# Patient Record
Sex: MALE | Race: AMERICAN INDIAN OR ALASKA NATIVE | NOT HISPANIC OR LATINO | Employment: UNEMPLOYED | URBAN - METROPOLITAN AREA
[De-identification: names, ages, dates, MRNs, and addresses within clinical notes are randomized per-mention and may not be internally consistent; named-entity substitution may affect disease eponyms.]

---

## 2022-07-03 ENCOUNTER — HOSPITAL ENCOUNTER (EMERGENCY)
Facility: CLINIC | Age: 52
Discharge: HOME | End: 2022-07-03
Payer: MEDICAID

## 2022-07-03 VITALS
TEMPERATURE: 97.4 F | WEIGHT: 190 LBS | HEART RATE: 80 BPM | DIASTOLIC BLOOD PRESSURE: 89 MMHG | BODY MASS INDEX: 26.6 KG/M2 | SYSTOLIC BLOOD PRESSURE: 116 MMHG | RESPIRATION RATE: 16 BRPM | OXYGEN SATURATION: 100 % | HEIGHT: 71 IN

## 2022-07-03 NOTE — ED TRIAGE NOTES
Triage Assessment & Note:    /89   Pulse 80   Temp 97.4  F (36.3  C) (Oral)   Resp 16   SpO2 100%       Patient presents with: Pt comes ambulatory to triage with reports of social work and medication refill. No reports of fever, cough, SOB, CP, or travel.     Home Treatments/Remedies: n/a    Febrile / Afebrile: afebrile    Duration of C/o: ongoing issues    Catherine An RN  July 3, 2022

## 2022-07-11 ENCOUNTER — TELEPHONE (OUTPATIENT)
Dept: BEHAVIORAL HEALTH | Facility: CLINIC | Age: 52
End: 2022-07-11

## 2022-07-11 ENCOUNTER — HOSPITAL ENCOUNTER (EMERGENCY)
Facility: CLINIC | Age: 52
Discharge: PSYCHIATRIC OR SUBSTANCE USE FACILITY | End: 2022-07-12
Attending: EMERGENCY MEDICINE | Admitting: EMERGENCY MEDICINE
Payer: MEDICAID

## 2022-07-11 DIAGNOSIS — F22 PARANOIA (H): ICD-10-CM

## 2022-07-11 DIAGNOSIS — F32.A DEPRESSION, UNSPECIFIED DEPRESSION TYPE: ICD-10-CM

## 2022-07-11 LAB
ALBUMIN SERPL BCG-MCNC: 3.7 G/DL (ref 3.5–5.2)
ALP SERPL-CCNC: 66 U/L (ref 40–129)
ALT SERPL W P-5'-P-CCNC: 47 U/L (ref 10–50)
AMPHETAMINES UR QL SCN: ABNORMAL
ANION GAP SERPL CALCULATED.3IONS-SCNC: 8 MMOL/L (ref 7–15)
AST SERPL W P-5'-P-CCNC: 32 U/L (ref 10–50)
BARBITURATES UR QL SCN: ABNORMAL
BASOPHILS # BLD AUTO: 0.1 10E3/UL (ref 0–0.2)
BASOPHILS NFR BLD AUTO: 1 %
BENZODIAZ UR QL SCN: ABNORMAL
BILIRUB SERPL-MCNC: 0.6 MG/DL
BUN SERPL-MCNC: 17.9 MG/DL (ref 6–20)
BZE UR QL SCN: ABNORMAL
CALCIUM SERPL-MCNC: 9.1 MG/DL (ref 8.6–10)
CANNABINOIDS UR QL SCN: ABNORMAL
CHLORIDE SERPL-SCNC: 104 MMOL/L (ref 98–107)
CREAT SERPL-MCNC: 1.01 MG/DL (ref 0.67–1.17)
DEPRECATED HCO3 PLAS-SCNC: 26 MMOL/L (ref 22–29)
EOSINOPHIL # BLD AUTO: 0.2 10E3/UL (ref 0–0.7)
EOSINOPHIL NFR BLD AUTO: 4 %
ERYTHROCYTE [DISTWIDTH] IN BLOOD BY AUTOMATED COUNT: 13.2 % (ref 10–15)
GFR SERPL CREATININE-BSD FRML MDRD: >90 ML/MIN/1.73M2
GLUCOSE SERPL-MCNC: 148 MG/DL (ref 70–99)
HCT VFR BLD AUTO: 40.3 % (ref 40–53)
HGB BLD-MCNC: 13 G/DL (ref 13.3–17.7)
IMM GRANULOCYTES # BLD: 0 10E3/UL
IMM GRANULOCYTES NFR BLD: 0 %
LYMPHOCYTES # BLD AUTO: 2.8 10E3/UL (ref 0.8–5.3)
LYMPHOCYTES NFR BLD AUTO: 42 %
MCH RBC QN AUTO: 30.7 PG (ref 26.5–33)
MCHC RBC AUTO-ENTMCNC: 32.3 G/DL (ref 31.5–36.5)
MCV RBC AUTO: 95 FL (ref 78–100)
MONOCYTES # BLD AUTO: 0.6 10E3/UL (ref 0–1.3)
MONOCYTES NFR BLD AUTO: 10 %
NEUTROPHILS # BLD AUTO: 2.9 10E3/UL (ref 1.6–8.3)
NEUTROPHILS NFR BLD AUTO: 43 %
NRBC # BLD AUTO: 0 10E3/UL
NRBC BLD AUTO-RTO: 0 /100
OPIATES UR QL SCN: ABNORMAL
PLAT MORPH BLD: NORMAL
PLATELET # BLD AUTO: 248 10E3/UL (ref 150–450)
POTASSIUM SERPL-SCNC: 3.9 MMOL/L (ref 3.4–5.3)
PROT SERPL-MCNC: 6.1 G/DL (ref 6.4–8.3)
RBC # BLD AUTO: 4.23 10E6/UL (ref 4.4–5.9)
RBC MORPH BLD: NORMAL
SARS-COV-2 RNA RESP QL NAA+PROBE: NEGATIVE
SODIUM SERPL-SCNC: 138 MMOL/L (ref 136–145)
WBC # BLD AUTO: 6.7 10E3/UL (ref 4–11)

## 2022-07-11 PROCEDURE — 99285 EMERGENCY DEPT VISIT HI MDM: CPT | Performed by: EMERGENCY MEDICINE

## 2022-07-11 PROCEDURE — 80307 DRUG TEST PRSMV CHEM ANLYZR: CPT | Performed by: EMERGENCY MEDICINE

## 2022-07-11 PROCEDURE — 250N000013 HC RX MED GY IP 250 OP 250 PS 637: Performed by: EMERGENCY MEDICINE

## 2022-07-11 PROCEDURE — 90791 PSYCH DIAGNOSTIC EVALUATION: CPT

## 2022-07-11 PROCEDURE — 82040 ASSAY OF SERUM ALBUMIN: CPT | Performed by: EMERGENCY MEDICINE

## 2022-07-11 PROCEDURE — C9803 HOPD COVID-19 SPEC COLLECT: HCPCS | Performed by: EMERGENCY MEDICINE

## 2022-07-11 PROCEDURE — 85025 COMPLETE CBC W/AUTO DIFF WBC: CPT | Performed by: EMERGENCY MEDICINE

## 2022-07-11 PROCEDURE — U0003 INFECTIOUS AGENT DETECTION BY NUCLEIC ACID (DNA OR RNA); SEVERE ACUTE RESPIRATORY SYNDROME CORONAVIRUS 2 (SARS-COV-2) (CORONAVIRUS DISEASE [COVID-19]), AMPLIFIED PROBE TECHNIQUE, MAKING USE OF HIGH THROUGHPUT TECHNOLOGIES AS DESCRIBED BY CMS-2020-01-R: HCPCS | Performed by: EMERGENCY MEDICINE

## 2022-07-11 PROCEDURE — 80053 COMPREHEN METABOLIC PANEL: CPT | Performed by: EMERGENCY MEDICINE

## 2022-07-11 PROCEDURE — 99285 EMERGENCY DEPT VISIT HI MDM: CPT | Mod: 25 | Performed by: EMERGENCY MEDICINE

## 2022-07-11 PROCEDURE — 36415 COLL VENOUS BLD VENIPUNCTURE: CPT | Performed by: EMERGENCY MEDICINE

## 2022-07-11 RX ORDER — ATENOLOL 50 MG/1
50 TABLET ORAL ONCE
Status: COMPLETED | OUTPATIENT
Start: 2022-07-11 | End: 2022-07-11

## 2022-07-11 RX ORDER — ATENOLOL 50 MG/1
50 TABLET ORAL 2 TIMES DAILY
COMMUNITY

## 2022-07-11 RX ORDER — PREGABALIN 100 MG/1
100 CAPSULE ORAL 3 TIMES DAILY
Status: COMPLETED | OUTPATIENT
Start: 2022-07-11 | End: 2022-07-11

## 2022-07-11 RX ORDER — ACETAMINOPHEN 325 MG/1
975 TABLET ORAL ONCE
Status: COMPLETED | OUTPATIENT
Start: 2022-07-11 | End: 2022-07-11

## 2022-07-11 RX ORDER — CLONAZEPAM 1 MG/1
1 TABLET ORAL 2 TIMES DAILY PRN
Status: ON HOLD | COMMUNITY
End: 2022-07-12

## 2022-07-11 RX ORDER — RISPERIDONE 2 MG/1
4 TABLET, ORALLY DISINTEGRATING ORAL ONCE
Status: COMPLETED | OUTPATIENT
Start: 2022-07-11 | End: 2022-07-11

## 2022-07-11 RX ORDER — CLONAZEPAM 0.5 MG/1
1 TABLET ORAL ONCE
Status: COMPLETED | OUTPATIENT
Start: 2022-07-11 | End: 2022-07-11

## 2022-07-11 RX ORDER — PREGABALIN 100 MG/1
100 CAPSULE ORAL 3 TIMES DAILY
Status: ON HOLD | COMMUNITY
End: 2022-07-12

## 2022-07-11 RX ORDER — RISPERIDONE 4 MG/1
4 TABLET ORAL DAILY
Status: ON HOLD | COMMUNITY
End: 2022-07-12

## 2022-07-11 RX ORDER — ATENOLOL 50 MG/1
50 TABLET ORAL ONCE
Status: DISCONTINUED | OUTPATIENT
Start: 2022-07-11 | End: 2022-07-12 | Stop reason: HOSPADM

## 2022-07-11 RX ORDER — OXYCODONE HYDROCHLORIDE 10 MG/1
10 TABLET ORAL ONCE
Status: COMPLETED | OUTPATIENT
Start: 2022-07-11 | End: 2022-07-11

## 2022-07-11 RX ORDER — CLONAZEPAM 0.5 MG/1
1 TABLET ORAL 2 TIMES DAILY
Status: COMPLETED | OUTPATIENT
Start: 2022-07-11 | End: 2022-07-11

## 2022-07-11 RX ORDER — PREGABALIN 100 MG/1
100 CAPSULE ORAL ONCE
Status: COMPLETED | OUTPATIENT
Start: 2022-07-11 | End: 2022-07-11

## 2022-07-11 RX ORDER — IBUPROFEN 800 MG/1
800 TABLET, FILM COATED ORAL EVERY 8 HOURS PRN
Status: ON HOLD | COMMUNITY
End: 2022-07-12

## 2022-07-11 RX ORDER — IBUPROFEN 600 MG/1
600 TABLET, FILM COATED ORAL ONCE
Status: COMPLETED | OUTPATIENT
Start: 2022-07-11 | End: 2022-07-11

## 2022-07-11 RX ADMIN — RISPERIDONE 4 MG: 2 TABLET, ORALLY DISINTEGRATING ORAL at 10:04

## 2022-07-11 RX ADMIN — CLONAZEPAM 1 MG: 0.5 TABLET ORAL at 09:36

## 2022-07-11 RX ADMIN — ATENOLOL 50 MG: 50 TABLET ORAL at 09:37

## 2022-07-11 RX ADMIN — IBUPROFEN 600 MG: 600 TABLET, FILM COATED ORAL at 10:03

## 2022-07-11 RX ADMIN — CLONAZEPAM 1 MG: 0.5 TABLET ORAL at 21:05

## 2022-07-11 RX ADMIN — PREGABALIN 100 MG: 100 CAPSULE ORAL at 09:37

## 2022-07-11 RX ADMIN — OXYCODONE HYDROCHLORIDE 10 MG: 10 TABLET ORAL at 21:05

## 2022-07-11 RX ADMIN — ACETAMINOPHEN 975 MG: 325 TABLET, FILM COATED ORAL at 10:04

## 2022-07-11 RX ADMIN — PREGABALIN 100 MG: 100 CAPSULE ORAL at 21:05
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
## 2022-07-11 NOTE — ED TRIAGE NOTES
Pt arrives to ED with c/o mental health issue and is wanting to get helped but has been denied. Pt states that is having trouble coping with all things that have happened to him.

## 2022-07-11 NOTE — ED PROVIDER NOTES
"ED Provider Note  [facility]      History     Chief Complaint   Patient presents with     Psychiatric Evaluation     HPI  Heraclio Johns is a 52 year old male who presents for mental health evaluation. He states that he's homeless, and has been feeling suicidal, no longer wants to live.  He states that a few days ago he was planning to jump off a bridge.  He states that he feels he is being continually persecuted by, \"hate groups.\"  He then goes on to talk about Satan worshipers and how they groom him.  He feels that people are targeting him and that he is unsafe.  He does admit to meth use 2 days ago, states he has not been sleeping.  He states that some people think he is crazy, but given that other people around him are hearing the same things he is hearing, he does not think so.  Denies any acute physical complaints or injury at this time.    Past Medical History  Past Medical History:   Diagnosis Date     Hepatitis C      History reviewed. No pertinent surgical history.  clonazePAM (KLONOPIN) 1 MG tablet  ibuprofen (ADVIL/MOTRIN) 800 MG tablet  pregabalin (LYRICA) 100 MG capsule  risperiDONE (RISPERDAL) 4 MG tablet      Allergies   Allergen Reactions     Codeine Itching     Depakote [Valproic Acid] Other (See Comments)     pancreatitis     Family History  History reviewed. No pertinent family history.  Social History   Social History     Tobacco Use     Smoking status: Current Every Day Smoker     Types: Cigarettes     Smokeless tobacco: Never Used   Substance Use Topics     Alcohol use: Yes     Drug use: Yes     Types: Methamphetamines      Past medical history, past surgical history, medications, allergies, family history, and social history were reviewed with the patient. No additional pertinent items.       Review of Systems  A complete review of systems was performed with pertinent positives and negatives noted in the HPI, and all other systems negative.    Physical Exam   BP: " (!) 147/106  Pulse: 85  Resp: 16  Weight: 86.2 kg (190 lb)  SpO2: 100 %  Physical Exam  Constitutional:       General: He is not in acute distress.     Appearance: He is not diaphoretic.   HENT:      Head: Atraumatic.   Eyes:      General: No scleral icterus.     Pupils: Pupils are equal, round, and reactive to light.   Cardiovascular:      Heart sounds: Normal heart sounds.   Pulmonary:      Effort: No respiratory distress.      Breath sounds: Normal breath sounds.   Abdominal:      Palpations: Abdomen is soft.      Tenderness: There is no abdominal tenderness.   Musculoskeletal:         General: No tenderness.   Skin:     General: Skin is warm.         ED Course      Procedures         Mental Health Risk Assessment      PSS-3    Date and Time Over the past 2 weeks have you felt down, depressed, or hopeless? Over the past 2 weeks have you had thoughts of killing yourself? Have you ever attempted to kill yourself? When did this last happen? User   07/11/22 0450 yes yes yes more than 6 months ago MP      C-SSRS ([place])    Date and Time Q1 Wished to be Dead (Past Month) Q2 Suicidal Thoughts (Past Month) Q3 Suicidal Thought Method Q4 Suicidal Intent without Specific Plan Q5 Suicide Intent with Specific Plan Q6 Suicide Behavior (Lifetime) Within the Past 3 Months? RETIRED: Level of Risk per Screen Screening Not Complete User   07/11/22 0534 yes yes yes no yes yes -- -- -- HK   07/11/22 0450 yes yes yes no yes yes -- -- -- MP                     No results found for any visits on 07/11/22.  Medications - No data to display     Assessments & Plan (with Medical Decision Making)   The patient does seem paranoid.  He is suicidal.  He denies any acute physical complaints or injuries.  We will have DEC see him.  He will be signed out at shift change to the oncoming provider pending assessment.    Dictation Disclaimer: Some of this Note has been completed with voice-recognition dictation software. Although errors are  generally corrected real-time, there is the potential for a rare error to be present in the completed chart.      I have reviewed the nursing notes. I have reviewed the findings, diagnosis, plan and need for follow up with the patient.    New Prescriptions    No medications on file       Final diagnoses:   Paranoia (H)   Depression, unspecified depression type       --  Tracy Forrest  [facility] EMERGENCY DEPARTMENT  7/11/2022     Tracy Forrest MD  07/11/22 0709
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
## 2022-07-11 NOTE — TELEPHONE ENCOUNTER
Pt brought self to [facility], SI.  B: Pt from [place] but left due to being estranged from family, Children dont want anything to do with him.  Pt [place].  States yesterday he was jumping in front of cars but they would swerve away.  Hx recent meth use, states use is on/off.  Pt states some paranoia of people out to kill him.   A: depressed and paranoid.  No aggression, cooperative, vol  R:  Patient cleared and ready for behavioral bed placement: Yes

## 2022-07-12 ENCOUNTER — TELEPHONE (OUTPATIENT)
Dept: BEHAVIORAL HEALTH | Facility: CLINIC | Age: 52
End: 2022-07-12

## 2022-07-12 ENCOUNTER — HOSPITAL ENCOUNTER (INPATIENT)
Facility: HOSPITAL | Age: 52
LOS: 6 days | Discharge: HOME | End: 2022-07-18
Attending: STUDENT IN AN ORGANIZED HEALTH CARE EDUCATION/TRAINING PROGRAM | Admitting: STUDENT IN AN ORGANIZED HEALTH CARE EDUCATION/TRAINING PROGRAM
Payer: MEDICAID

## 2022-07-12 VITALS
RESPIRATION RATE: 18 BRPM | WEIGHT: 190 LBS | TEMPERATURE: 98.1 F | BODY MASS INDEX: 26.5 KG/M2 | OXYGEN SATURATION: 100 % | HEART RATE: 64 BPM | SYSTOLIC BLOOD PRESSURE: 96 MMHG | DIASTOLIC BLOOD PRESSURE: 66 MMHG

## 2022-07-12 DIAGNOSIS — M54.50 CHRONIC MIDLINE LOW BACK PAIN WITHOUT SCIATICA: ICD-10-CM

## 2022-07-12 DIAGNOSIS — G89.29 CHRONIC MIDLINE LOW BACK PAIN WITHOUT SCIATICA: ICD-10-CM

## 2022-07-12 DIAGNOSIS — F41.9 ANXIETY DISORDER, UNSPECIFIED TYPE: Primary | ICD-10-CM

## 2022-07-12 DIAGNOSIS — F31.9 BIPOLAR AFFECTIVE DISORDER, REMISSION STATUS UNSPECIFIED (H): ICD-10-CM

## 2022-07-12 PROBLEM — Z59.00 HOMELESS: Status: ACTIVE | Noted: 2022-06-10

## 2022-07-12 PROBLEM — F90.2 ADHD (ATTENTION DEFICIT HYPERACTIVITY DISORDER), COMBINED TYPE: Status: ACTIVE | Noted: 2022-07-11

## 2022-07-12 PROBLEM — F15.20 AMPHETAMINE USE DISORDER, MODERATE (H): Status: ACTIVE | Noted: 2022-06-10

## 2022-07-12 PROBLEM — F15.20 AMPHETAMINE DEPENDENCE (H): Status: ACTIVE | Noted: 2022-07-11

## 2022-07-12 PROBLEM — K21.9 GERD (GASTROESOPHAGEAL REFLUX DISEASE): Status: ACTIVE | Noted: 2022-05-04

## 2022-07-12 PROBLEM — F90.9 ADHD: Status: ACTIVE | Noted: 2021-06-06

## 2022-07-12 PROBLEM — F17.200 TOBACCO DEPENDENCE: Status: ACTIVE | Noted: 2022-07-11

## 2022-07-12 PROBLEM — R45.89 SUICIDAL BEHAVIOR: Status: ACTIVE | Noted: 2022-07-12

## 2022-07-12 PROBLEM — R23.8 BLISTERS OF MULTIPLE SITES: Status: ACTIVE | Noted: 2022-06-10

## 2022-07-12 PROBLEM — G90.529 REFLEX SYMPATHETIC DYSTROPHY OF LOWER EXTREMITY: Status: ACTIVE | Noted: 2019-06-27

## 2022-07-12 PROBLEM — I10 HYPERTENSION: Status: ACTIVE | Noted: 2022-06-16

## 2022-07-12 PROCEDURE — 99223 1ST HOSP IP/OBS HIGH 75: CPT | Mod: AI | Performed by: NURSE PRACTITIONER

## 2022-07-12 PROCEDURE — 124N000001 HC R&B MH

## 2022-07-12 PROCEDURE — 99222 1ST HOSP IP/OBS MODERATE 55: CPT | Performed by: NURSE PRACTITIONER

## 2022-07-12 PROCEDURE — 250N000013 HC RX MED GY IP 250 OP 250 PS 637: Performed by: NURSE PRACTITIONER

## 2022-07-12 PROCEDURE — 250N000013 HC RX MED GY IP 250 OP 250 PS 637: Performed by: EMERGENCY MEDICINE

## 2022-07-12 RX ORDER — ATENOLOL 50 MG/1
50 TABLET ORAL ONCE
Status: COMPLETED | OUTPATIENT
Start: 2022-07-12 | End: 2022-07-12

## 2022-07-12 RX ORDER — POLYETHYLENE GLYCOL 3350 17 G/17G
17 POWDER, FOR SOLUTION ORAL DAILY PRN
Status: DISCONTINUED | OUTPATIENT
Start: 2022-07-12 | End: 2022-07-18 | Stop reason: HOSPADM

## 2022-07-12 RX ORDER — CLONAZEPAM 1 MG/1
1 TABLET ORAL 2 TIMES DAILY
COMMUNITY
Start: 2022-07-05 | End: 2022-07-12

## 2022-07-12 RX ORDER — OLANZAPINE 10 MG/1
10 TABLET ORAL 3 TIMES DAILY PRN
Status: DISCONTINUED | OUTPATIENT
Start: 2022-07-12 | End: 2022-07-18 | Stop reason: HOSPADM

## 2022-07-12 RX ORDER — RISPERIDONE 2 MG/1
4 TABLET ORAL AT BEDTIME
Status: DISCONTINUED | OUTPATIENT
Start: 2022-07-12 | End: 2022-07-14

## 2022-07-12 RX ORDER — CLONAZEPAM 0.5 MG/1
1 TABLET ORAL 2 TIMES DAILY
Status: COMPLETED | OUTPATIENT
Start: 2022-07-12 | End: 2022-07-12

## 2022-07-12 RX ORDER — MAGNESIUM HYDROXIDE/ALUMINUM HYDROXICE/SIMETHICONE 120; 1200; 1200 MG/30ML; MG/30ML; MG/30ML
30 SUSPENSION ORAL EVERY 4 HOURS PRN
Status: DISCONTINUED | OUTPATIENT
Start: 2022-07-12 | End: 2022-07-18 | Stop reason: HOSPADM

## 2022-07-12 RX ORDER — PREGABALIN 100 MG/1
100 CAPSULE ORAL 3 TIMES DAILY
Status: ON HOLD | COMMUNITY
Start: 2022-07-05 | End: 2022-07-17

## 2022-07-12 RX ORDER — LANOLIN ALCOHOL/MO/W.PET/CERES
3 CREAM (GRAM) TOPICAL
Status: DISCONTINUED | OUTPATIENT
Start: 2022-07-12 | End: 2022-07-18 | Stop reason: HOSPADM

## 2022-07-12 RX ORDER — IBUPROFEN 600 MG/1
600 TABLET, FILM COATED ORAL ONCE
Status: DISCONTINUED | OUTPATIENT
Start: 2022-07-12 | End: 2022-07-12 | Stop reason: HOSPADM

## 2022-07-12 RX ORDER — OXYCODONE HYDROCHLORIDE 5 MG/1
5 TABLET ORAL ONCE
Status: COMPLETED | OUTPATIENT
Start: 2022-07-12 | End: 2022-07-12

## 2022-07-12 RX ORDER — PREGABALIN 100 MG/1
100 CAPSULE ORAL 3 TIMES DAILY
Status: COMPLETED | OUTPATIENT
Start: 2022-07-12 | End: 2022-07-12

## 2022-07-12 RX ORDER — HYDROXYZINE HYDROCHLORIDE 25 MG/1
50 TABLET, FILM COATED ORAL EVERY 4 HOURS PRN
Status: DISCONTINUED | OUTPATIENT
Start: 2022-07-12 | End: 2022-07-18 | Stop reason: HOSPADM

## 2022-07-12 RX ORDER — ACETAMINOPHEN 325 MG/1
650 TABLET ORAL EVERY 4 HOURS PRN
Status: DISCONTINUED | OUTPATIENT
Start: 2022-07-12 | End: 2022-07-18 | Stop reason: HOSPADM

## 2022-07-12 RX ORDER — IBUPROFEN 800 MG/1
800 TABLET, FILM COATED ORAL EVERY 8 HOURS PRN
Status: DISCONTINUED | OUTPATIENT
Start: 2022-07-12 | End: 2022-07-18 | Stop reason: HOSPADM

## 2022-07-12 RX ORDER — RISPERIDONE 4 MG/1
4 TABLET ORAL AT BEDTIME
Status: ON HOLD | COMMUNITY
Start: 2022-07-05 | End: 2022-07-17

## 2022-07-12 RX ORDER — OLANZAPINE 10 MG/2ML
10 INJECTION, POWDER, FOR SOLUTION INTRAMUSCULAR 3 TIMES DAILY PRN
Status: DISCONTINUED | OUTPATIENT
Start: 2022-07-12 | End: 2022-07-18 | Stop reason: HOSPADM

## 2022-07-12 RX ORDER — IBUPROFEN 200 MG
600 TABLET ORAL
Status: ON HOLD | COMMUNITY
Start: 2022-06-23 | End: 2022-07-17

## 2022-07-12 RX ORDER — CLONAZEPAM 0.5 MG/1
0.5 TABLET ORAL 2 TIMES DAILY
Status: DISCONTINUED | OUTPATIENT
Start: 2022-07-12 | End: 2022-07-14

## 2022-07-12 RX ADMIN — CLONAZEPAM 0.5 MG: 0.5 TABLET ORAL at 20:34

## 2022-07-12 RX ADMIN — IBUPROFEN 800 MG: 800 TABLET, FILM COATED ORAL at 17:46

## 2022-07-12 RX ADMIN — ATENOLOL 50 MG: 50 TABLET ORAL at 08:21

## 2022-07-12 RX ADMIN — PREGABALIN 100 MG: 75 CAPSULE ORAL at 20:34

## 2022-07-12 RX ADMIN — OXYCODONE HYDROCHLORIDE 5 MG: 5 TABLET ORAL at 08:28

## 2022-07-12 RX ADMIN — PREGABALIN 100 MG: 100 CAPSULE ORAL at 08:21

## 2022-07-12 RX ADMIN — CLONAZEPAM 1 MG: 0.5 TABLET ORAL at 08:21

## 2022-07-12 RX ADMIN — RISPERIDONE 4 MG: 2 TABLET ORAL at 20:34

## 2022-07-12 ASSESSMENT — ACTIVITIES OF DAILY LIVING (ADL)
CONCENTRATING,_REMEMBERING_OR_MAKING_DECISIONS_DIFFICULTY: NO
ADLS_ACUITY_SCORE: 30
ADLS_ACUITY_SCORE: 30
TOILETING_ISSUES: NO
ADLS_ACUITY_SCORE: 43
FALL_HISTORY_WITHIN_LAST_SIX_MONTHS: YES
DRESSING/BATHING_DIFFICULTY: NO
VISION_MANAGEMENT: GLASSES
ADLS_ACUITY_SCORE: 45
DIFFICULTY_EATING/SWALLOWING: NO
EQUIPMENT_CURRENTLY_USED_AT_HOME: CANE, STRAIGHT
NUMBER_OF_TIMES_PATIENT_HAS_FALLEN_WITHIN_LAST_SIX_MONTHS: 180
WEAR_GLASSES_OR_BLIND: YES
ADLS_ACUITY_SCORE: 30
ADLS_ACUITY_SCORE: 30
WALKING_OR_CLIMBING_STAIRS_DIFFICULTY: NO
DOING_ERRANDS_INDEPENDENTLY_DIFFICULTY: NO

## 2022-07-12 NOTE — TELEPHONE ENCOUNTER
R: 5Sbasia / Lj / Meron  00:36 - On call accepts. Placed pt in queue. 00:40 - Notified unit. 00:42 - Notified ED    [facility] ED: [phone]

## 2022-07-12 NOTE — PROGRESS NOTES
07/12/22 1318   Patient Belongings   Did you bring any home meds/supplements to the hospital?  No   Patient Belongings remains with patient;locker;sent to security per site process   Patient Belongings Remaining with Patient glasses;body jewelry   Patient Belongings Put in Hospital Secure Location (Security or Locker, etc.) clothing;wallet;other (see comments);shoes   Belongings Search Yes   Clothing Search Yes   Second Staff Terence (RN)   Comment blue flashlight, pen, pnecil, lighter, pocket notebook, misc. papers, rolling papers, black baseball cap, black shoes, 3 pairs socks, belt, mask x2, cargo shorts, black underwear, green t shirt   List items sent to safe: brown wallet, [place] ID, community card, business card, library card, misc. Papers          All other belongings put in assigned cubby in belongings room: see comment       I have reviewed my belongings list on admission and verify that it is correct.     Patient signature_______________________________    Second staff witness (if patient unable to sign) ______________________________       I have received all my belongings at discharge.    Patient signature________________________________    Kita   7/12/2022  1:22 PM

## 2022-07-12 NOTE — H&P
[facility]    History and Physical  Medical Services       Date of Admission:  7/12/2022  Date of Service (when I saw the patient): 07/12/22    Assessment & Plan     Principal Problem:    Suicidal behavior    Active Medical Problems:    Hypertension- vitals stable, denies chest pain, sob. He reports he takes atenolol. His bp is on the low side at 94/61 and pulse 53. Will hold the atenolol at this time.       GERD (gastroesophageal reflux disease)- denies GERD symptoms. Pt states he stopped his GERD medication and has not needed them since his cholecystectomy.       Chronic viral hepatitis C (H)- AST, ALT wnl. Denies abdominal pain. Pt denies he has not been treated.     Left leg pain- pt report in 2018 he had a laparoscopic knee surgery for a meniscus repair which led to compartmental syndrome. He reports chronic pain and reports he uses a cane at home. Explained that canes can not be used on the inpatient unit  And he would like a walker. Pt is requesting oxycodone, OxyContin, ketamine to name a few of the medication requests he had. Explained I would reviewed prescribe home medications and would order what was appropriate. Pt became pressured and continued to ask for different narcotics. He declined Lidoderm patches. Tylenol, ibuprofen prn. Liver function is stable. Alternate tylenol with ibuprofen. Walker ordered, OT consult.     Pt medically stable, no acute medical concerns. Chronic medical problems stable. Will sign off. Please consult for any new medical issues or concerns.       Code Status: Full Code    Margo Walters CNP    Primary Care Physician   Physician No Ref-Primary    Chief Complaint   Psych evaluation     History is obtained from the patient and medical chart     History of Present Illness   (per ED) Heraclio Johns is a 52 year old male who presents for mental health evaluation. He states that he's homeless, and has been feeling suicidal, no longer wants to live.  He states that a few  "days ago he was planning to jump off a bridge.  He states that he feels he is being continually persecuted by, \"hate groups.\"  He then goes on to talk about Satan worshipers and how they groom him.  He feels that people are targeting him and that he is unsafe.  He does admit to meth use 2 days ago, states he has not been sleeping.  He states that some people think he is crazy, but given that other people around him are hearing the same things he is hearing, he does not think so.  Denies any acute physical complaints or injury at this time.    Past Medical History    I have reviewed this patient's medical history and updated it with pertinent information if needed.   Past Medical History:   Diagnosis Date     Hepatitis C        Past Surgical History   I have reviewed this patient's surgical history and updated it with pertinent information if needed.  No past surgical history on file.    Prior to Admission Medications   Prior to Admission Medications   Prescriptions Last Dose Informant Patient Reported? Taking?   atenolol (TENORMIN) 50 MG tablet  Self Yes No   Sig: Take 50 mg by mouth 2 times daily   clonazePAM (KLONOPIN) 1 MG tablet  Self Yes No   Sig: Take 1 mg by mouth 2 times daily as needed for anxiety   ibuprofen (ADVIL/MOTRIN) 800 MG tablet  Self Yes No   Sig: Take 800 mg by mouth every 8 hours as needed for moderate pain   pregabalin (LYRICA) 100 MG capsule  Self Yes No   Sig: Take 100 mg by mouth 3 times daily   risperiDONE (RISPERDAL) 4 MG tablet  Self Yes No   Sig: Take 4 mg by mouth daily      Facility-Administered Medications: None     Allergies   Allergies   Allergen Reactions     Codeine Itching     Depakote [Valproic Acid] Other (See Comments)     pancreatitis       Social History   I have reviewed this patient's social history and updated it with pertinent information if needed. Heraclio Johns  reports that he has been smoking cigarettes. He has never used smokeless tobacco. He reports current " alcohol use. He reports current drug use. Drug: Methamphetamines.    Family History   I have reviewed this patient's family history and updated it with pertinent information if needed.   No family history on file.    Review of Systems   CONSTITUTIONAL:  negative  EYES:  negative  HEENT:  negative  RESPIRATORY:  negative  CARDIOVASCULAR:  negative  GASTROINTESTINAL:  negative  GENITOURINARY:  negative  INTEGUMENT/BREAST:  negative  HEMATOLOGIC/LYMPHATIC:  negative  ALLERGIC/IMMUNOLOGIC:  negative  ENDOCRINE:  negative  MUSCULOSKELETAL:  Negative except chronic pain  NEUROLOGICAL:  negative    Physical Exam   Temp: 97.8  F (36.6  C) Temp src: Temporal BP: 94/61 Pulse: 53   Resp: 16 SpO2: 98 % O2 Device: None (Room air)    Vital Signs with Ranges  Temp:  [97.8  F (36.6  C)-98.1  F (36.7  C)] 97.8  F (36.6  C)  Pulse:  [53-68] 53  Resp:  [16-18] 16  BP: ()/(61-74) 94/61  SpO2:  [98 %-100 %] 98 %  0 lbs 0 oz    Constitutional: awake, alert, cooperative, no apparent distress, and appears stated age, vitals stable  Eyes: Lids and lashes normal, pupils equal, round and reactive to light, extra ocular muscles intact, sclera clear, conjunctiva normal  ENT: Normocephalic, without obvious abnormality, atraumatic, external ears without lesions, oral pharynx with moist mucous membranes, no erythema or exudates  Hematologic / Lymphatic: no cervical lymphadenopathy  Respiratory: No increased work of breathing, good air exchange, clear to auscultation bilaterally, no crackles or wheezing  Cardiovascular: Normal apical impulse, regular rate and rhythm, normal S1 and S2, no S3 or S4, and no murmur noted  GI: normal bowel sounds, soft, non-distended, non-tender, no masses palpated, no hepatosplenomegally  Genitounirinary: deferred  Skin: normal skin color, texture, turgor and no redness, warmth, or swelling  Musculoskeletal: There is no redness, warmth, or swelling of the joints.  Full range of motion noted.    Neurologic: Awake,  alert, oriented to name, place and time.  Neuropsychiatric: General: pressured, at times elevated, and normal eye contact    Data   Data reviewed today:   Recent Labs   Lab 07/11/22  2108   WBC 6.7   HGB 13.0*   MCV 95         POTASSIUM 3.9   CHLORIDE 104   CO2 26   BUN 17.9   CR 1.01   ANIONGAP 8   FRANCESCA 9.1   *   ALBUMIN 3.7   PROTTOTAL 6.1*   BILITOTAL 0.6   ALKPHOS 66   ALT 47   AST 32       No results found for this or any previous visit (from the past 24 hour(s)).
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
## 2022-07-13 PROCEDURE — 250N000013 HC RX MED GY IP 250 OP 250 PS 637: Performed by: NURSE PRACTITIONER

## 2022-07-13 PROCEDURE — 99232 SBSQ HOSP IP/OBS MODERATE 35: CPT | Performed by: NURSE PRACTITIONER

## 2022-07-13 PROCEDURE — 124N000001 HC R&B MH

## 2022-07-13 PROCEDURE — 999N000209 HC STATISTIC OT OUTPT VISIT

## 2022-07-13 RX ADMIN — CLONAZEPAM 0.5 MG: 0.5 TABLET ORAL at 09:22

## 2022-07-13 RX ADMIN — IBUPROFEN 800 MG: 800 TABLET, FILM COATED ORAL at 13:33

## 2022-07-13 RX ADMIN — CLONAZEPAM 0.5 MG: 0.5 TABLET ORAL at 20:58

## 2022-07-13 RX ADMIN — IBUPROFEN 800 MG: 800 TABLET, FILM COATED ORAL at 21:02

## 2022-07-13 RX ADMIN — RISPERIDONE 4 MG: 2 TABLET ORAL at 20:58

## 2022-07-13 RX ADMIN — ACETAMINOPHEN 650 MG: 325 TABLET, FILM COATED ORAL at 21:02

## 2022-07-13 RX ADMIN — PREGABALIN 100 MG: 75 CAPSULE ORAL at 13:33

## 2022-07-13 RX ADMIN — PREGABALIN 100 MG: 75 CAPSULE ORAL at 20:58

## 2022-07-13 RX ADMIN — PREGABALIN 100 MG: 75 CAPSULE ORAL at 09:22

## 2022-07-13 ASSESSMENT — ACTIVITIES OF DAILY LIVING (ADL)
ADLS_ACUITY_SCORE: 30
HYGIENE/GROOMING: INDEPENDENT

## 2022-07-13 NOTE — PROGRESS NOTES
"Pt referred with malnutrition screen score of 5 - weight loss >34#, decreased appetite.     52 yom admitted with suicidal ideation.  Hx noted to include:  drug use, HTN, GERD, viral hepatitis.      Ht-71\", Wt-190# - Per [facility]  IBWR is 155-189#.  BMI is 26.5  No documented weight hx noted.     Regular diet ordered.  Pt has consumed 100% meals since being admitted.      Labs/meds reviewed.     Estimated nutritional needs are 0158-9809 calories (25-30 kcal/kg), 86 gm protein (1.0 gm/kg).      Pt does not meet necessary criteria for malnutrition diagnosis.  Current weight and intake acceptable.  No nutrition interventions indicated at this time.  RD will follow weights/intake.   "
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
## 2022-07-13 NOTE — PROGRESS NOTES
"Attempted to meet with pt x2 today for OT eval.  Pt in bed both times, states that he is very tired and is unable to focus or participate because he is so tired.  Declines getting out of bed to ambulate with walker for assessment, states his leg is \"on fire\" and \"they are not giving me the pain meds I need so I will just be miserable here\".  Will attempt OT eval tomorrow.    "

## 2022-07-13 NOTE — PLAN OF CARE
"Social Service Psychosocial Assessment  Attempted to speak with pt for assessment. x2 pt unwilling to participate due to sleeping. Notes grabbed from ED and nursing.  Presenting Problem: Pt presents for mental health evaluation. He states that he's homeless, and has been feeling suicidal, no longer wants to live. He states that a few days ago he was planning to jump off a bridge.   Marital Status:     Spouse / Children: None   Psychiatric TX HX: This is the pt's first time on our unit. Pt noted one admission during [facility] which resulted in a detox. Pt noted one admission in [place] within the past six months as a previous in-patient mental health admissions.  Suicide Risk Assessment: Pt admitted with SI and plan to jump off a bridge. Pt has hx of SI and other plans of self harm. Unable to assess if pt is suicidal today during assessment.   Access to Lethal Means (explain): Denies  Family Psych HX: Unknown    A & Ox: x4  Medication Adherence: See H&P  Medical Issues: See H&P    Visual -Motor Functioning: Good     Communication Skills /Needs: Good  Ethnicity:   Spirituality/Cheondoism Affiliation: None    Clergy Request: No   History: None reported   Living Situation: Currently homeless and has been staying at the [facility].    ADL s: Independent   Education: Unknown  Financial Situation: Unknown     Occupation: Unemployed   Leisure & Recreation: Unknown    Childhood History: Unknown    Trauma Abuse HX:  Unknown   Relationship / Sexuality: Unknown   Substance Use/ Abuse: Utox positive for Amphetamines and cannabinoids. Pt shared history of Residential treatment for Cocaine and Methamphetamine.   Chemical Dependency Treatment HX: Pt noted \"I was clean from 0864-0166\" noting he has had multiple relapses since that time with Methamphetamine use.   Legal Issues: Pt denied any current legal concerns or pending charges. Pt did share a history of incarceration in group home in 1996 after a sex " offense charge  Significant Life Events: Pt appears to have been seen two times at minimum in the last month through [facility] for on going substance use and housing concerns.   Strengths: Ability to communicate needs, in a safe environment   Challenges /Limitation: Homeless, lack of services    Patient Support Contact (Include name, relationship, number, and summary of conversation): Pt does not have any CORBIN's signed at this time.    Interventions:       Community-Based Programs- Would benefit    Medical/Dental Care- No PCP listed     CD Evaluation/Rule 25/Aftercare- Would benefit    Medication Management- Would benefit    Individual Therapy- Would benefit    Housing/Placement- Homeless    Insurance Coverage- MEDICAID    Suicide Risk Assessment- Pt admitted with SI and plan to jump off a bridge. Pt has hx of SI and other plans of self harm. Unable to assess if pt is suicidal today during assessment.     High Risk Safety Plan- Talk to supports; Call crisis lines; Go to local ER if feeling suicidal.  REEMA RIOJAS  7/13/2022  8:46 AM
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
## 2022-07-13 NOTE — PLAN OF CARE
"  Problem: Behavioral Health Plan of Care  Goal: Patient-Specific Goal (Individualization)  Description: Patient will eat >50% of meals.   Patient will attend >50% of groups.     Outcome: Ongoing, Not Progressing  Note: 15:40: Received end of shift report from Mariama BEACH. Pt sitting out in dayroom upon arrival, mood is calm, blunted affect.     21:05: Pt isolates self in room majority of shift, compliant with HS medication administration. PRN ibuprofen et PRN acetaminophen given for \"6' /10 neck/back pain. States little relief with scheduled lyrica, oxycodone works the best, \"They won't give me my usual medications.\" \"Ritalin helps.\"     Face to face end of shift report to be communicated to on-coming [facility] staff.     Tania Farrar RN  7/13/2022  9:31 PM        Problem: Thought Process Alteration  Goal: Optimal Thought Clarity  Description: Patient will have a decrease in paranoia by discharge.  Patient will be able to participate in nursing assessment.   Patient will sleep 6-8 hours per night.   Outcome: Ongoing, Progressing     Problem: Suicide Risk  Goal: Absence of Self-Harm  Outcome: Ongoing, Progressing          "

## 2022-07-13 NOTE — DISCHARGE INSTRUCTIONS
Behavioral Discharge Planning and Instructions    Summary:     Main Diagnosis:     Health Care Follow-up:     NUMBERS TO CALL IN CRISIS    National Suicide Prevention Lifeline ([facility])  1-519.736.4454    Crisis Text Line ([place])  Text  HOME  to 669961    Mental Health Crisis Line ([place], [place])  423.408.9683    [facility] Mobile Crisis ([place], [place])   583-634-6977      If you are feeling very unsafe, call 911 or bring yourself to the Emergency Room        Counseling in [place]:  Sergio Campos           [phone]  [facility]    Tamiko Marsh, [facility]      [phone]  [facility]     Nissa Washington, [facility] (young kids)    [phone]   Venecia Crespo, [facility] (older kids & adults)  [phone]   Oumar Chester, [facility]      [phone]  [facility]       [phone]   Ross Love MS,    Nicole Barajas MA, [facility]   Wojciech Ríos MS psychotherapist   Linette Trujillo MS, [facility]   Isaura Perdomo, [facility], counselor   Catherine Love [facility], counselor  [place]        [phone]  Bradly Gallagher, counseling  Dr Deepthi Whiteside, psychiatry  Keisha Motley, counseling  Yohannes Slaughter, counseling  Blair Francois, counseling  Haylie Harris, psychiatry   [facility]       [phone]   Rolanda Sewell MA, LMFT, RPFS   Sandra Parekh MSW, [facility]          [phone]  [facility]      [phone]   Catherine Cancino MS, [facility]          [phone]        Ann Heart MSW, [facility] , C-MI   Dayana Londono MSW, [facility]                                                    Conner Ruiz [facility]      Rama Najera MS, [facility]                [phone]      Marina MitchellyD, owner      Yumiko WARNER, BRANNON Corrigan PhD   Lizette Ludwig MSW, LICSW [facility]       [phone]   Candis [facility]   Mathew ADLER,  CNP,     Sujey Bansal MSW, LGSW (adolescents)   Jackie Grinnel APRN, CNP (Hib via telehealth; adolescents)   Jenise KUMAR CNP (Hib via telehealth)   Jose Sullivan MA, LPC, BCIA (Hib via telehealth)   Debbie Arriola [facility] MSW, LGSW   Margo Deniserabee LICSW   Ezequiel Haley DNP, APRN, CNP   Tracy Alexander RN, BSN   Erika Tirado RN-BC, BSN (Hib via telehealth)  [facility]         [phone]   Tata Odell, MSN, [facility]           [phone]   Jorge Luke MA, [facility]   Jaqueline Lyles MS RN [facility] NP   Catherine Carpenter, [employer]         [phone]  [facility]       [phone]   [facility]   Jennifer Danielson (to be LPC)   Ga Zavala (to be [facility])      Counseling in [place]:  [facility]       [phone]   mental health & CD counseling  Jose Harrell       [phone]  [facility]       [phone]  [facility]        [phone]   Rolanda Brown  [facility]       [facility]              [phone]   can do weekends and evenings   Genaro Campo, MSW, LICSW, LCSW, CCTP    Carroll Díaz MA, LMFT, [facility]  [facility]       evenings, weekends  [phone]      Counseling in [place]:  [facility]         [phone]   Tata Odell, MSN, [facility]   Grace Beach MA, [facility]  [facility]      [phone]  [facility]       [phone]   Venessa Marsh PMFT  [facility]       Giovanna Nicolas       [phone]  [place] [id number] [facility]     [facility]      [phone]  [facility]     Yohannes Krishnamurthy MS Ed, [facility]    [phone]    (kids) denotes providers who also see kids      Attend all scheduled appointments with your outpatient providers. Call at least 24 hours in advance if you need to reschedule an appointment to ensure continued access to your outpatient  providers.     Major Treatments, Procedures and Findings:  You were provided with: a psychiatric assessment, assessed for medical stability, medication evaluation and/or management, group therapy, family therapy, individual therapy, CD evaluation/assessment, milieu management, and medical interventions    Symptoms to Report: feeling more aggressive, increased confusion, losing more sleep, mood getting worse, or thoughts of suicide    Early warning signs can include: increased depression or anxiety sleep disturbances increased thoughts or behaviors of suicide or self-harm  increased unusual thinking, such as paranoia or hearing voices    Safety and Wellness:  Take all medicines as directed.  Make no changes unless your doctor suggests them.      Follow treatment recommendations.  Refrain from alcohol and non-prescribed drugs.  Ask your support system to help you reduce your access to items that could harm yourself or others. Items could include:  Firearms  Medicines (both prescribed and over-the-counter)  Knives and other sharp objects  Ropes and like materials  Car keys  If there is a concern for safety, call 911. If there is a concern for safety, call 911.    Resources:   Crisis Intervention: 138.587.6447 or 955-363-4697 (TTY: 665.396.3973).  Call anytime for help.  National [place] on Mental Illness (www.[place].jj.org): 125.706.3956 or 689-205-1045.  [place] Association for Children's Mental Health (www.macmh.org): 907.400.2233.  Alcoholics Anonymous (www.alcoholics-anonymous.org): Check your phone book for your local chapter.  Suicide Awareness Voices of Education (SAVE) (www.save.org): 259-667-CLXB (8811)  National Suicide Prevention Line (www.mentalhealthmn.org): 616-533-JRVA (6619)  Mental Health Consumer/Survivor Network of [place] (www.mhcsn.net): 206.726.4246 or 157-751-2535  Mental Health Association of [place] (www.mentalhealth.org): 323.102.2045 or 491-658-5761  Self- Management and Recovery Training., SMART-- Toll  "free: 390.957.3217  www.[employer]  Text 4 Life: txt \"LIFE\" to 01895 for immediate support and crisis intervention  Crisis text line: Text \"[place]\" to 450839. Free, confidential, 24/7.  Crisis Intervention: 227.376.3098 or 358-079-7319. Call anytime for help.     General Medication Instructions:   See your medication sheet(s) for instructions.   Take all medicines as directed.  Make no changes unless your doctor suggests them.   Go to all your doctor visits.  Be sure to have all your required lab tests. This way, your medicines can be refilled on time.  Do not use any drugs not prescribed by your doctor.  Avoid alcohol.    Advance Directives:   Scanned document on file with [facility]? No scanned doc  Is document scanned? Pt states no documents  Honoring Choices Your Rights Handout: Informed and given  Was more information offered? Pt declined    The Treatment team has appreciated the opportunity to work with you. If you have any questions or concerns about your recent admission, you can contact the unit which can receive your call 24 hours a day, 7 days a week. They will be able to get in touch with a Provider if needed. The unit number is [phone] .  "

## 2022-07-13 NOTE — PLAN OF CARE
"  Problem: Behavioral Health Plan of Care  Goal: Patient-Specific Goal (Individualization)  Description: Patient will eat >50% of meals.   Patient will attend >50% of groups.     Outcome: Ongoing, Progressing     Problem: Thought Process Alteration  Goal: Optimal Thought Clarity  Description: Patient will have a decrease in paranoia by discharge.  Patient will be able to participate in nursing assessment.   Patient will sleep 6-8 hours per night.   Outcome: Ongoing, Progressing   Goal Outcome Evaluation:      Pt came out for breakfast but returned to his room to bed. Did wake up and took am medications willingly. Calm and cooperative. Stated he felt better today when he was in the lounge eating. Affect flat and blunted. Didn't feel like talking too much this am. Will assess at length when he wakes up. No complaints or requests.  1330- medicated with Advil 800 mg po c/o 7/10 LLE nerve pain. Pt states he's extremely tired d/t \"they won't give me my Ritalin and I sleep forever if I don't get it and my pain is so bad in my calf - I've used Ketamine cream but they won't give me that either- I have a lot less muscle on that leg vanessa it's so weak and painful- no one believes me that I need those meds\".  Pt states he feels safe here \"I don't have to worry if someone is going to get me- this is the best I have ever slept\"  Face to face end of shift report communicated to BAYLEE Cole RN  7/13/2022  10:34 AM                     "

## 2022-07-13 NOTE — PLAN OF CARE
Problem: Behavioral Health Plan of Care  Goal: Patient-Specific Goal (Individualization)  Description: Patient will eat >50% of meals.   Patient will attend >50% of groups.     Note: Patient laying in bed with eyes closed, non-labored breathing and position changes noted all shift. No concerns at this time. Continue to monitor.     Face to face end of shift report communicated to oncoming RN.     Venecia Wallace RN  7/13/2022  4:27 AM

## 2022-07-14 PROCEDURE — 250N000009 HC RX 250: Performed by: NURSE PRACTITIONER

## 2022-07-14 PROCEDURE — 250N000013 HC RX MED GY IP 250 OP 250 PS 637: Performed by: NURSE PRACTITIONER

## 2022-07-14 PROCEDURE — 99233 SBSQ HOSP IP/OBS HIGH 50: CPT | Performed by: NURSE PRACTITIONER

## 2022-07-14 PROCEDURE — 999N000209 HC STATISTIC OT OUTPT VISIT

## 2022-07-14 PROCEDURE — 124N000001 HC R&B MH

## 2022-07-14 RX ORDER — CLONAZEPAM 1 MG/1
1 TABLET ORAL 2 TIMES DAILY
Status: DISCONTINUED | OUTPATIENT
Start: 2022-07-14 | End: 2022-07-18 | Stop reason: HOSPADM

## 2022-07-14 RX ORDER — CLONAZEPAM 1 MG/1
1 TABLET ORAL 2 TIMES DAILY
Status: DISCONTINUED | OUTPATIENT
Start: 2022-07-14 | End: 2022-07-14

## 2022-07-14 RX ORDER — CLONAZEPAM 0.5 MG/1
0.25 TABLET ORAL 2 TIMES DAILY
Status: DISCONTINUED | OUTPATIENT
Start: 2022-07-14 | End: 2022-07-14

## 2022-07-14 RX ADMIN — ACETAMINOPHEN 650 MG: 325 TABLET, FILM COATED ORAL at 21:20

## 2022-07-14 RX ADMIN — Medication: at 21:22

## 2022-07-14 RX ADMIN — PREGABALIN 100 MG: 75 CAPSULE ORAL at 14:31

## 2022-07-14 RX ADMIN — PREGABALIN 100 MG: 75 CAPSULE ORAL at 08:55

## 2022-07-14 RX ADMIN — CLONAZEPAM 1 MG: 1 TABLET ORAL at 15:01

## 2022-07-14 RX ADMIN — PREGABALIN 100 MG: 75 CAPSULE ORAL at 21:21

## 2022-07-14 RX ADMIN — IBUPROFEN 800 MG: 800 TABLET, FILM COATED ORAL at 21:21

## 2022-07-14 RX ADMIN — RISPERIDONE 5 MG: 3 TABLET ORAL at 21:21

## 2022-07-14 RX ADMIN — CLONAZEPAM 0.25 MG: 0.5 TABLET ORAL at 08:55

## 2022-07-14 ASSESSMENT — ACTIVITIES OF DAILY LIVING (ADL)
ADLS_ACUITY_SCORE: 30
HYGIENE/GROOMING: INDEPENDENT
ADLS_ACUITY_SCORE: 30

## 2022-07-14 NOTE — PLAN OF CARE
"  Problem: Behavioral Health Plan of Care  Goal: Patient-Specific Goal (Individualization)  Description: Patient will eat >50% of meals.   Patient will attend >50% of groups.     Outcome: Ongoing, Progressing  Note: 15:40: Received end of shift report BAYLEE Leslie. Pt cleaning up room upon arrival; showered.     18:00; Brighter affect, less paranoid et withdrawn, participates in unit milieu.    20:00: T.O.R.B: Okay for orajel prn per shruthi lui CNP; noc-on-call c/o of left upper et lower molar pain; needing extraction.     21:24: PRN orajel given for c/o upper et lower molar pain; needing tooth extraction, questioning extraction process while inpatient. PRN ibuprofen PRN acetaminophen given in addition to HS scheduled medication \"6\"/10 chronic low back pain.     22:00: Pt displaying s/s of sleep; effective prn relief noted.     Face to face end of shift report to be communicated to on-coming NOC staff.     Tania Farrar RN  7/14/2022  10:58 PM                 Problem: Thought Process Alteration  Goal: Optimal Thought Clarity  Description: Patient will have a decrease in paranoia by discharge.  Patient will be able to participate in nursing assessment.   Patient will sleep 6-8 hours per night.   Outcome: Ongoing, Progressing     Problem: Suicide Risk  Goal: Absence of Self-Harm  Outcome: Ongoing, Progressing   Goal Outcome Evaluation:                      "

## 2022-07-14 NOTE — PLAN OF CARE
Report received from outgoing staff.    Problem: Behavioral Health Plan of Care  Goal: Patient-Specific Goal (Individualization)  Description: Patient will eat >50% of meals.   Patient will attend >50% of groups.   Outcome: Ongoing, Progressing     Problem: Thought Process Alteration  Goal: Optimal Thought Clarity  Description: Patient will have a decrease in paranoia by discharge.  Patient will be able to participate in nursing assessment.   Patient will sleep 6-8 hours per night.   Unable to assess pt resting.    Outcome: Ongoing, Progressing     Problem: Suicide Risk  Goal: Absence of Self-Harm  No suicidal gestures or comments noted.    Outcome: Ongoing, Progressing    Rounds completed. Safety checks completed every 15 minutes throughout the night. Pt noted to be resting with eyes closed and easy resp. for 6 hours. No suicidal gestures or comments noted.    Face to face end of shift report to be communicated to oncoming RN.

## 2022-07-14 NOTE — PROGRESS NOTES
"Pt up on the unit this afternoon, attending groups.  States to this writer that he does not need the walker.  Reports his pain is \"fine\".  Reports that the distances he needs to walk on the unit are short enough that he does not need a walker.  Observed pt on the unit ambulating, no concerns, steady, no loss of balance.  Walker removed from pts room.  Will discharge OT order at this time.    "

## 2022-07-14 NOTE — PLAN OF CARE
"  Problem: Behavioral Health Plan of Care  Goal: Patient-Specific Goal (Individualization)  Description: Patient will eat >50% of meals.   Patient will attend >50% of groups.     Outcome: Ongoing, Not Progressing     Problem: Suicide Risk  Goal: Absence of Self-Harm  Outcome: Ongoing, Not Progressing   Goal Outcome Evaluation:     Pt continues to feel suicidal with no intent- contracts for safety. States he has chronic fatigue and pain and just wants to lay in bed. Walker at bedside- states he \"might use it if I walk\". Isolates and withdraws from socializing with peers , will talk with writer and express feelings. Depression with anxiety- no hallucinations. Receives scheduled Lyrica with fair pain control to LLE from post arthroscopy and compartment syndrome. States this has weakened  him .  1500- new order to return to Klonopin 1 mg po BID restarted. April NP stated it was ok to give the 1 mg even though he had the .25 mg po recently.  1510- [facility] more today[facility]  Face to face end of shift report communicated to BAYLEE Cole RN  7/14/2022  8:19 AM                     "
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
## 2022-07-15 PROCEDURE — 99233 SBSQ HOSP IP/OBS HIGH 50: CPT | Performed by: NURSE PRACTITIONER

## 2022-07-15 PROCEDURE — 250N000013 HC RX MED GY IP 250 OP 250 PS 637: Performed by: NURSE PRACTITIONER

## 2022-07-15 PROCEDURE — 124N000001 HC R&B MH

## 2022-07-15 RX ADMIN — CLONAZEPAM 1 MG: 1 TABLET ORAL at 08:11

## 2022-07-15 RX ADMIN — PREGABALIN 100 MG: 75 CAPSULE ORAL at 13:49

## 2022-07-15 RX ADMIN — PREGABALIN 100 MG: 75 CAPSULE ORAL at 20:31

## 2022-07-15 RX ADMIN — RISPERIDONE 5 MG: 3 TABLET ORAL at 20:31

## 2022-07-15 RX ADMIN — CLONAZEPAM 1 MG: 1 TABLET ORAL at 13:49

## 2022-07-15 RX ADMIN — PREGABALIN 100 MG: 75 CAPSULE ORAL at 08:11

## 2022-07-15 ASSESSMENT — ACTIVITIES OF DAILY LIVING (ADL)
ADLS_ACUITY_SCORE: 30
HYGIENE/GROOMING: INDEPENDENT
ADLS_ACUITY_SCORE: 30

## 2022-07-15 NOTE — PROGRESS NOTES
The SW submitted Referral to [facility]. SW let them know that they should contact the [facility] unit after reviewing the referral.
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
## 2022-07-15 NOTE — PLAN OF CARE
"  Problem: Behavioral Health Plan of Care  Goal: Patient-Specific Goal (Individualization)  Description: Patient will eat >50% of meals.   Patient will attend >50% of groups.     Outcome: Ongoing, Progressing     Problem: Thought Process Alteration  Goal: Optimal Thought Clarity  Description: Patient will have a decrease in paranoia by discharge.  Patient will be able to participate in nursing assessment.   Patient will sleep 6-8 hours per night.   Outcome: Ongoing, Progressing     Problem: Suicide Risk  Goal: Absence of Self-Harm  Outcome: Ongoing, Progressing   Goal Outcome Evaluation:      Pt spends most of his day in his room sleeping. States \"vanessa I don't have ritalin\". Does come out to the lounge for meals. Encouraged to attend group. Remains depressed and somewhat suicidal. Feels less anxiety vanessa I feel safe here and I cn sleep without worries about getting hurt by someone\". No delusional talk or agitated behavior .   1400- pt now denies SI thoughts or feelings that someone is after him. Pt asked writer if there was Ritalin in the cup of medications that was being issued him. Pt was disappointed that it has not been ordered and stated \"well, I'm just gonna sleep all day\". No acting out- remains calm.  Face to face end of shift report communicated to BAYLEE Cole RN  7/15/2022  9:55 AM                     "

## 2022-07-15 NOTE — PLAN OF CARE
"SHIFT SUMMARY:  Repeatedly asking for Adderall, stating \"That's why I'm here.\" Calm and cooperative. Sitting in the lounge, watching TV. Eating more than 50% of meals. Not attending groups. No paranoid statements made.       Problem: Behavioral Health Plan of Care  Goal: Patient-Specific Goal (Individualization)  Description: Patient will eat >50% of meals.   Patient will attend >50% of groups.     Outcome: Ongoing, Progressing  Goal: Absence of New-Onset Illness or Injury  Outcome: Ongoing, Progressing     Problem: Thought Process Alteration  Goal: Optimal Thought Clarity  Description: Patient will have a decrease in paranoia by discharge.  Patient will be able to participate in nursing assessment.   Patient will sleep 6-8 hours per night.   Outcome: Ongoing, Progressing     Problem: Suicide Risk  Goal: Absence of Self-Harm  Outcome: Ongoing, Progressing   Goal Outcome Evaluation:                      "

## 2022-07-16 PROCEDURE — 250N000013 HC RX MED GY IP 250 OP 250 PS 637: Performed by: NURSE PRACTITIONER

## 2022-07-16 PROCEDURE — 99232 SBSQ HOSP IP/OBS MODERATE 35: CPT | Performed by: NURSE PRACTITIONER

## 2022-07-16 PROCEDURE — 124N000001 HC R&B MH

## 2022-07-16 RX ADMIN — HYDROXYZINE HYDROCHLORIDE 50 MG: 25 TABLET, FILM COATED ORAL at 18:03

## 2022-07-16 RX ADMIN — RISPERIDONE 5 MG: 3 TABLET ORAL at 20:16

## 2022-07-16 RX ADMIN — PREGABALIN 100 MG: 75 CAPSULE ORAL at 08:05

## 2022-07-16 RX ADMIN — PREGABALIN 100 MG: 75 CAPSULE ORAL at 14:35

## 2022-07-16 RX ADMIN — CLONAZEPAM 1 MG: 1 TABLET ORAL at 14:35

## 2022-07-16 RX ADMIN — IBUPROFEN 800 MG: 800 TABLET, FILM COATED ORAL at 15:55

## 2022-07-16 RX ADMIN — PREGABALIN 100 MG: 75 CAPSULE ORAL at 20:15

## 2022-07-16 RX ADMIN — ACETAMINOPHEN 650 MG: 325 TABLET, FILM COATED ORAL at 18:02

## 2022-07-16 RX ADMIN — CLONAZEPAM 1 MG: 1 TABLET ORAL at 08:05

## 2022-07-16 ASSESSMENT — ACTIVITIES OF DAILY LIVING (ADL)
HYGIENE/GROOMING: INDEPENDENT
ADLS_ACUITY_SCORE: 30
ORAL_HYGIENE: INDEPENDENT
ADLS_ACUITY_SCORE: 30
HYGIENE/GROOMING: INDEPENDENT
ADLS_ACUITY_SCORE: 30
DRESS: SCRUBS (BEHAVIORAL HEALTH);INDEPENDENT
ADLS_ACUITY_SCORE: 30

## 2022-07-16 NOTE — PLAN OF CARE
Face to face shift report received from nurse. Rounding completed, pt observed.    Problem: Behavioral Health Plan of Care  Goal: Patient-Specific Goal (Individualization)  Description: Patient will eat >50% of meals.   Patient will attend >50% of groups.     Outcome: Ongoing, Not Progressing   Pt has been in bed with eyes closed and regular respirations observed all night. Will continue to monitor. Patient slept 6.5 hours.     Face to face report will be communicated to oncoming RN.    Adeel Lan RN  7/16/2022
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
## 2022-07-16 NOTE — PLAN OF CARE
"  Problem: Behavioral Health Plan of Care  Goal: Patient-Specific Goal (Individualization)  Description: Patient will eat >50% of meals.   Patient will attend >50% of groups.     Outcome: Ongoing, Progressing     Problem: Thought Process Alteration  Goal: Optimal Thought Clarity  Description: Patient will have a decrease in paranoia by discharge.  Patient will be able to participate in nursing assessment.   Patient will sleep 6-8 hours per night.   Outcome: Ongoing, Progressing     Problem: Suicide Risk  Goal: Absence of Self-Harm  Outcome: Ongoing, Progressing   Goal Outcome Evaluation    Pt questioned writer if the \"coffee was decaf vanessa they all taste like decaf- they said they were going to make regular- I don't like to be lied to- I might as well go back to [place]- they don't lie to me there\". Pt irritable and short . Pt remaining calm. Medication compliant. No other requests  1200- pt in a better mood . Spends his day out in the lounge or group. Pt remains cooperative.  Face to face end of shift report communicated to BAYLEE Cole RN  7/16/2022  8:39 AM                         "
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
## 2022-07-17 PROCEDURE — 124N000001 HC R&B MH

## 2022-07-17 PROCEDURE — 250N000013 HC RX MED GY IP 250 OP 250 PS 637: Performed by: NURSE PRACTITIONER

## 2022-07-17 PROCEDURE — 99239 HOSP IP/OBS DSCHRG MGMT >30: CPT | Performed by: NURSE PRACTITIONER

## 2022-07-17 RX ORDER — RISPERIDONE 1 MG/1
5 TABLET ORAL AT BEDTIME
Qty: 37 TABLET | Refills: 0 | Status: SHIPPED | OUTPATIENT
Start: 2022-07-17

## 2022-07-17 RX ORDER — PREGABALIN 100 MG/1
100 CAPSULE ORAL 3 TIMES DAILY
Qty: 21 CAPSULE | Refills: 0 | Status: SHIPPED | OUTPATIENT
Start: 2022-07-17

## 2022-07-17 RX ORDER — CLONAZEPAM 1 MG/1
1 TABLET ORAL 2 TIMES DAILY PRN
Qty: 14 TABLET | Refills: 0 | Status: SHIPPED | OUTPATIENT
Start: 2022-07-17 | End: 2022-07-24

## 2022-07-17 RX ADMIN — PREGABALIN 100 MG: 75 CAPSULE ORAL at 08:14

## 2022-07-17 RX ADMIN — CLONAZEPAM 1 MG: 1 TABLET ORAL at 08:14

## 2022-07-17 RX ADMIN — PREGABALIN 100 MG: 75 CAPSULE ORAL at 20:11

## 2022-07-17 RX ADMIN — ACETAMINOPHEN 650 MG: 325 TABLET, FILM COATED ORAL at 20:11

## 2022-07-17 RX ADMIN — RISPERIDONE 5 MG: 3 TABLET ORAL at 20:11

## 2022-07-17 RX ADMIN — CLONAZEPAM 1 MG: 1 TABLET ORAL at 13:34

## 2022-07-17 RX ADMIN — PREGABALIN 100 MG: 75 CAPSULE ORAL at 13:34

## 2022-07-17 RX ADMIN — IBUPROFEN 800 MG: 800 TABLET, FILM COATED ORAL at 20:11

## 2022-07-17 ASSESSMENT — ACTIVITIES OF DAILY LIVING (ADL)
ADLS_ACUITY_SCORE: 30
DRESS: SCRUBS (BEHAVIORAL HEALTH);INDEPENDENT
ADLS_ACUITY_SCORE: 30
ADLS_ACUITY_SCORE: 30
HYGIENE/GROOMING: INDEPENDENT
ADLS_ACUITY_SCORE: 30
ORAL_HYGIENE: INDEPENDENT
ADLS_ACUITY_SCORE: 30

## 2022-07-17 NOTE — PLAN OF CARE
Face to face shift report received from nurse. Rounding completed, pt observed.    Problem: Behavioral Health Plan of Care  Goal: Patient-Specific Goal (Individualization)  Description: Patient will eat >50% of meals.   Patient will attend >50% of groups.   Outcome: Ongoing, Progressing   Pt has been in bed with eyes closed and regular respirations observed all night. Will continue to monitor. Patient slept 6.5 hours.     Face to face report will be communicated to oncoming RN.    Adeel aLn RN  7/17/2022
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
## 2022-07-17 NOTE — PLAN OF CARE
Problem: Behavioral Health Plan of Care  Goal: Patient-Specific Goal (Individualization)  Description: Patient will eat >50% of meals.   Patient will attend >50% of groups.   Outcome: Ongoing, Progressing     Problem: Thought Process Alteration  Goal: Optimal Thought Clarity  Description: Patient will have a decrease in paranoia by discharge.  Patient will be able to participate in nursing assessment.   Patient will sleep 6-8 hours per night.   Outcome: Ongoing, Progressing     Problem: Suicide Risk  Goal: Absence of Self-Harm  Outcome: Ongoing, Progressing     Face to face shift report received from BAYLEE Denis. Rounding completed, pt observed laying in bed, appeared to be sleeping.    Patient up to the lounge for breakfast then back to bed to lay down. Pt took AM medications as ordered.     Met with patient in his room, he was very short in answering questions and did not really engage in conversation. He did not look at writer during encounter. He denies all MH criteria.     Patient woke up and ate lunch in the lounge. He has since been up and has been very staff seeking in behavior.     Prescriptions faxed to [facility] in [place]; risperidone, lyrica, klonopin. Ride has been set up with [employer] for  tomorrow (Mon 7/18) at 0800, they will bring him to [facility] to  his medications before bringing him to the [facility].    Patient took afternoon medications as ordered.     Face to face report will be communicated to [facility]giovani [facility].    Magdalene Harris RN  7/17/2022  2:47 PM

## 2022-07-18 VITALS
BODY MASS INDEX: 25.24 KG/M2 | DIASTOLIC BLOOD PRESSURE: 99 MMHG | HEART RATE: 68 BPM | RESPIRATION RATE: 16 BRPM | WEIGHT: 181 LBS | TEMPERATURE: 98.4 F | OXYGEN SATURATION: 98 % | SYSTOLIC BLOOD PRESSURE: 145 MMHG

## 2022-07-18 PROCEDURE — 250N000013 HC RX MED GY IP 250 OP 250 PS 637: Performed by: NURSE PRACTITIONER

## 2022-07-18 RX ADMIN — PREGABALIN 100 MG: 75 CAPSULE ORAL at 07:22

## 2022-07-18 RX ADMIN — CLONAZEPAM 1 MG: 1 TABLET ORAL at 07:23

## 2022-07-18 ASSESSMENT — ACTIVITIES OF DAILY LIVING (ADL)
ADLS_ACUITY_SCORE: 30

## 2022-07-18 NOTE — PLAN OF CARE
Pt is discharging at the recommendation of the treatment team. [place] to [place] [facility]. Pt denies having any thoughts of hurting themself or anyone else. Pt denies anxiety or depression. Pt has resources listed. Discharge instructions, including; demographic sheet, psychiatric evaluation, discharge summary, and AVS were faxed to these next level of care providers.

## 2022-07-18 NOTE — PLAN OF CARE
Problem: Behavioral Health Plan of Care  Goal: Patient-Specific Goal (Individualization)  Description: Patient will eat >50% of meals.   Patient will attend >50% of groups.     Outcome: Ongoing, Progressing     Patient slept through the night.  Regular respirations and position changes noted.  Early breakfast and bag lunch brought to floor as patient is discharging at 0800.  Face to face end of shift report communicated to day shift RN.     Pinky Loomis RN  7/18/2022  6:14 AM

## 2022-07-18 NOTE — PLAN OF CARE
"Problem: Behavioral Health Plan of Care  Goal: Patient-Specific Goal (Individualization)  Description: Patient will eat >50% of meals.   Patient will attend >50% of groups.   Outcome: Ongoing, Progressing  Note: Pt has been up on the unit the majority of the shift. Pt frequently seeking out staff. 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 I start on the 22nd. I have an appointment on Wednesday with my med provider and am going to try to get back on oxy for the pain in my leg, so I can be on it for more than 4 hours.\" Pt verbalized that he is \"happy\" and stated \"being here ended up being an answered prayer from God.\" Pt endorsed anxiety about all of the things on his \"to do list.\"     2011 - Pt received PRN Tylenol and Ibuprofen for c/o \"10/10\" left leg pain. He was compliant with his scheduled medications.    Face to face end of shift report communicated to oncoming RN.       Problem: Thought Process Alteration  Goal: Optimal Thought Clarity  Description: Patient will have a decrease in paranoia by discharge.  Patient will be able to participate in nursing assessment.   Patient will sleep 6-8 hours per night.   Outcome: Ongoing, Progressing  Note: Pt participated in the nursing assessment. He denied paranoia tonight and did not make any paranoid statements.      Problem: Suicide Risk  Goal: Absence of Self-Harm  Outcome: Ongoing, Progressing  Note: Pt denied suicidal ideation. He remained free from self harm.      Goal Outcome Evaluation:    Plan of Care Reviewed With: patient                 " Detail Level: Simple Additional Notes: gentle cleansing without sharp debridement. Continue follow up with wound care Render Risk Assessment In Note?: no

## 2022-07-18 NOTE — CARE PLAN
Discharge Note    Patient Discharged to home (he states he will be at a [facility]) on 7/18/2022 8:19 AM [employer] accompanied by  staff to door.     Patient informed of discharge instructions in AVS. patient verbalizes understanding and denies having any questions pertaining to AVS. Patient stable at time of discharge. Patient denies SI, HI, and thoughts of self harm at time of discharge. All personal belongings returned to patient. Discharge prescriptions sent to [facility] pharmacy (These medications were transferred to [facility] in [place], [place] r/t patient's time of discharge/[facility] will not be open in time.  [facility], taxi service, and patient updated on this change.) via fax. Psych evaluation, history and physical, AVS, and discharge summary faxed to next level of care- Per .     Yamilex Mandel RN  7/18/2022  8:19 AM